# Patient Record
Sex: FEMALE | Race: WHITE | NOT HISPANIC OR LATINO
[De-identification: names, ages, dates, MRNs, and addresses within clinical notes are randomized per-mention and may not be internally consistent; named-entity substitution may affect disease eponyms.]

---

## 2017-09-15 PROBLEM — Z00.00 ENCOUNTER FOR PREVENTIVE HEALTH EXAMINATION: Status: ACTIVE | Noted: 2017-09-15

## 2017-10-10 ENCOUNTER — APPOINTMENT (OUTPATIENT)
Dept: RHEUMATOLOGY | Facility: CLINIC | Age: 72
End: 2017-10-10
Payer: MEDICARE

## 2017-10-10 VITALS
WEIGHT: 142 LBS | RESPIRATION RATE: 16 BRPM | OXYGEN SATURATION: 98 % | DIASTOLIC BLOOD PRESSURE: 96 MMHG | SYSTOLIC BLOOD PRESSURE: 162 MMHG | BODY MASS INDEX: 26.81 KG/M2 | HEIGHT: 61 IN | TEMPERATURE: 97.9 F | HEART RATE: 79 BPM

## 2017-10-10 DIAGNOSIS — Z82.49 FAMILY HISTORY OF ISCHEMIC HEART DISEASE AND OTHER DISEASES OF THE CIRCULATORY SYSTEM: ICD-10-CM

## 2017-10-10 DIAGNOSIS — Z86.79 PERSONAL HISTORY OF OTHER DISEASES OF THE CIRCULATORY SYSTEM: ICD-10-CM

## 2017-10-10 DIAGNOSIS — Z80.0 FAMILY HISTORY OF MALIGNANT NEOPLASM OF DIGESTIVE ORGANS: ICD-10-CM

## 2017-10-10 DIAGNOSIS — Z86.69 PERSONAL HISTORY OF OTHER DISEASES OF THE NERVOUS SYSTEM AND SENSE ORGANS: ICD-10-CM

## 2017-10-10 PROCEDURE — 99205 OFFICE O/P NEW HI 60 MIN: CPT

## 2017-10-10 RX ORDER — TIMOLOL MALEATE 5 MG/ML
SOLUTION/ DROPS OPHTHALMIC
Refills: 0 | Status: ACTIVE | COMMUNITY

## 2017-10-10 RX ORDER — AMLODIPINE BESYLATE 10 MG/1
10 TABLET ORAL DAILY
Refills: 0 | Status: ACTIVE | COMMUNITY

## 2017-10-12 LAB
ANA PAT FLD IF-IMP: ABNORMAL
ANA SER IF-ACNC: ABNORMAL
CCP AB SER IA-ACNC: 20 UNITS
CK BB SERPL ELPH-CCNC: 0 % (ref 0–?)
CK MB CFR SERPL ELPH: 0 %
CK MM SERPL ELPH-CCNC: 100 %
CREATINE KINASE,TOTAL,SERUM: 55 U/L
CRP SERPL-MCNC: 0.6 MG/DL
ERYTHROCYTE [SEDIMENTATION RATE] IN BLOOD BY WESTERGREN METHOD: 21 MM/HR
MACRO TYPE 1: 0 %
MACRO TYPE 2: 0 %
RF+CCP IGG SER-IMP: ABNORMAL

## 2017-10-25 ENCOUNTER — MOBILE ON CALL (OUTPATIENT)
Age: 72
End: 2017-10-25

## 2017-10-31 ENCOUNTER — APPOINTMENT (OUTPATIENT)
Dept: RHEUMATOLOGY | Facility: CLINIC | Age: 72
End: 2017-10-31
Payer: MEDICARE

## 2017-10-31 VITALS
SYSTOLIC BLOOD PRESSURE: 128 MMHG | HEIGHT: 61 IN | BODY MASS INDEX: 27.38 KG/M2 | WEIGHT: 145 LBS | OXYGEN SATURATION: 99 % | TEMPERATURE: 98 F | DIASTOLIC BLOOD PRESSURE: 82 MMHG | RESPIRATION RATE: 18 BRPM | HEART RATE: 86 BPM

## 2017-10-31 PROCEDURE — 99214 OFFICE O/P EST MOD 30 MIN: CPT

## 2017-10-31 RX ORDER — DICLOFENAC SODIUM 10 MG/G
1 GEL TOPICAL
Qty: 1 | Refills: 3 | Status: ACTIVE | COMMUNITY
Start: 2017-10-31 | End: 1900-01-01

## 2017-11-13 ENCOUNTER — CHART COPY (OUTPATIENT)
Age: 72
End: 2017-11-13

## 2018-03-06 ENCOUNTER — APPOINTMENT (OUTPATIENT)
Dept: RHEUMATOLOGY | Facility: CLINIC | Age: 73
End: 2018-03-06
Payer: MEDICARE

## 2018-03-06 VITALS
RESPIRATION RATE: 18 BRPM | OXYGEN SATURATION: 94 % | DIASTOLIC BLOOD PRESSURE: 84 MMHG | HEIGHT: 61 IN | TEMPERATURE: 98.4 F | HEART RATE: 84 BPM | BODY MASS INDEX: 27.38 KG/M2 | SYSTOLIC BLOOD PRESSURE: 136 MMHG | WEIGHT: 145 LBS

## 2018-03-06 LAB
CRP SERPL-MCNC: 1.2 MG/DL
ERYTHROCYTE [SEDIMENTATION RATE] IN BLOOD BY WESTERGREN METHOD: 24 MM/HR

## 2018-03-06 PROCEDURE — 99214 OFFICE O/P EST MOD 30 MIN: CPT

## 2018-03-07 LAB
ANA SER IF-ACNC: NEGATIVE
CCP AB SER IA-ACNC: <8 UNITS
RF+CCP IGG SER-IMP: NEGATIVE

## 2018-03-16 ENCOUNTER — MOBILE ON CALL (OUTPATIENT)
Age: 73
End: 2018-03-16

## 2018-04-20 ENCOUNTER — MOBILE ON CALL (OUTPATIENT)
Age: 73
End: 2018-04-20

## 2018-04-24 ENCOUNTER — LABORATORY RESULT (OUTPATIENT)
Age: 73
End: 2018-04-24

## 2018-04-24 ENCOUNTER — APPOINTMENT (OUTPATIENT)
Dept: RHEUMATOLOGY | Facility: CLINIC | Age: 73
End: 2018-04-24
Payer: MEDICARE

## 2018-04-24 VITALS
HEIGHT: 61 IN | SYSTOLIC BLOOD PRESSURE: 134 MMHG | OXYGEN SATURATION: 98 % | RESPIRATION RATE: 16 BRPM | HEART RATE: 83 BPM | WEIGHT: 144 LBS | TEMPERATURE: 98.3 F | DIASTOLIC BLOOD PRESSURE: 82 MMHG | BODY MASS INDEX: 27.19 KG/M2

## 2018-04-24 PROCEDURE — 99215 OFFICE O/P EST HI 40 MIN: CPT | Mod: 25

## 2018-04-24 PROCEDURE — 20610 DRAIN/INJ JOINT/BURSA W/O US: CPT | Mod: RT

## 2018-04-24 RX ADMIN — METHYLPREDNISOLONE ACETATE 0 MG/ML: 40 INJECTION, SUSPENSION INTRA-ARTICULAR; INTRALESIONAL; INTRAMUSCULAR; SOFT TISSUE at 00:00

## 2018-04-24 RX ADMIN — Medication 0 %: at 00:00

## 2018-04-25 ENCOUNTER — MED ADMIN CHARGE (OUTPATIENT)
Age: 73
End: 2018-04-25

## 2018-04-25 LAB
ALBUMIN SERPL ELPH-MCNC: 3.8 G/DL
ALP BLD-CCNC: 71 U/L
ALT SERPL-CCNC: 6 U/L
ANION GAP SERPL CALC-SCNC: 17 MMOL/L
APPEARANCE: CLEAR
AST SERPL-CCNC: 19 U/L
BASOPHILS # BLD AUTO: 0.04 K/UL
BASOPHILS NFR BLD AUTO: 0.4 %
BILIRUB SERPL-MCNC: 0.3 MG/DL
BILIRUBIN URINE: NEGATIVE
BLOOD URINE: NEGATIVE
BUN SERPL-MCNC: 22 MG/DL
CALCIUM SERPL-MCNC: 10.2 MG/DL
CCP AB SER IA-ACNC: 8 UNITS
CHLORIDE SERPL-SCNC: 100 MMOL/L
CO2 SERPL-SCNC: 22 MMOL/L
COLOR: YELLOW
CREAT SERPL-MCNC: 0.73 MG/DL
CRP SERPL-MCNC: 1.5 MG/DL
EOSINOPHIL # BLD AUTO: 0.04 K/UL
EOSINOPHIL NFR BLD AUTO: 0.4 %
ERYTHROCYTE [SEDIMENTATION RATE] IN BLOOD BY WESTERGREN METHOD: 40 MM/HR
GLUCOSE QUALITATIVE U: NEGATIVE MG/DL
GLUCOSE SERPL-MCNC: 68 MG/DL
HCT VFR BLD CALC: 39.2 %
HGB BLD-MCNC: 11.9 G/DL
IMM GRANULOCYTES NFR BLD AUTO: 0.1 %
KETONES URINE: NEGATIVE
LEUKOCYTE ESTERASE URINE: ABNORMAL
LYMPHOCYTES # BLD AUTO: 1.42 K/UL
LYMPHOCYTES NFR BLD AUTO: 15.9 %
MAN DIFF?: NORMAL
MCHC RBC-ENTMCNC: 27.8 PG
MCHC RBC-ENTMCNC: 30.4 GM/DL
MCV RBC AUTO: 91.6 FL
MONOCYTES # BLD AUTO: 0.59 K/UL
MONOCYTES NFR BLD AUTO: 6.6 %
NEUTROPHILS # BLD AUTO: 6.85 K/UL
NEUTROPHILS NFR BLD AUTO: 76.6 %
NITRITE URINE: NEGATIVE
PH URINE: 5.5
PLATELET # BLD AUTO: 468 K/UL
POTASSIUM SERPL-SCNC: 4 MMOL/L
PROT SERPL-MCNC: 8.3 G/DL
PROTEIN URINE: NEGATIVE MG/DL
RBC # BLD: 4.28 M/UL
RBC # FLD: 14.6 %
RF+CCP IGG SER-IMP: NEGATIVE
RHEUMATOID FACT SER QL: <7 IU/ML
SODIUM SERPL-SCNC: 139 MMOL/L
SPECIFIC GRAVITY URINE: 1.02
TSH SERPL-ACNC: 3.15 UIU/ML
URATE SERPL-MCNC: 3.3 MG/DL
UROBILINOGEN URINE: NEGATIVE MG/DL
WBC # FLD AUTO: 8.95 K/UL

## 2018-04-25 RX ORDER — LIDOCAINE HYDROCHLORIDE 10 MG/ML
1 INJECTION, SOLUTION INFILTRATION; PERINEURAL
Qty: 0 | Refills: 0 | Status: COMPLETED | OUTPATIENT
Start: 2018-04-24

## 2018-04-25 RX ORDER — METHYLPRED ACET/NACL,ISO-OS/PF 80 MG/ML
80 VIAL (ML) INJECTION
Qty: 1 | Refills: 0 | Status: COMPLETED | OUTPATIENT
Start: 2018-04-24

## 2018-04-26 LAB
ALBUMIN MFR SERPL ELPH: 45.6 %
ALBUMIN SERPL-MCNC: 3.8 G/DL
ALBUMIN/GLOB SERPL: 0.8 RATIO
ALBUPE: 27.3 %
ALDOLASE SERPL-CCNC: 5 U/L
ALPHA1 GLOB MFR SERPL ELPH: 6.7 %
ALPHA1 GLOB SERPL ELPH-MCNC: 0.6 G/DL
ALPHA1UPE: 12.2 %
ALPHA2 GLOB MFR SERPL ELPH: 13.3 %
ALPHA2 GLOB SERPL ELPH-MCNC: 1.1 G/DL
ALPHA2UPE: 17.2 %
ANA PAT FLD IF-IMP: ABNORMAL
ANA SER IF-ACNC: ABNORMAL
B-GLOBULIN MFR SERPL ELPH: 16.3 %
B-GLOBULIN SERPL ELPH-MCNC: 1.4 G/DL
BETAUPE: 14.3 %
CK BB SERPL ELPH-CCNC: 0 % (ref 0–?)
CK MB CFR SERPL ELPH: 0 %
CK MM SERPL ELPH-CCNC: 100 %
CREAT 24H UR-MCNC: NORMAL G/24 H
CREATINE KINASE,TOTAL,SERUM: 126 U/L
CREATININE UR (MAYO): 76 MG/DL
GAMMA GLOB FLD ELPH-MCNC: 1.5 G/DL
GAMMA GLOB MFR SERPL ELPH: 18.1 %
GAMMAUPE: 29 %
IGA 24H UR QL IFE: NORMAL
INTERPRETATION SERPL IEP-IMP: NORMAL
KAPPA LC 24H UR QL: NORMAL
MACRO TYPE 1: 0 %
MACRO TYPE 2: 0 %
PROT PATTERN 24H UR ELPH-IMP: NORMAL
PROT SERPL-MCNC: 8.3 G/DL
PROT SERPL-MCNC: 8.3 G/DL
PROT UR-MCNC: 9 MG/DL
PROT UR-MCNC: 9 MG/DL
SPECIMEN VOL 24H UR: NORMAL ML

## 2018-05-14 ENCOUNTER — MOBILE ON CALL (OUTPATIENT)
Age: 73
End: 2018-05-14

## 2018-05-24 ENCOUNTER — APPOINTMENT (OUTPATIENT)
Dept: RHEUMATOLOGY | Facility: CLINIC | Age: 73
End: 2018-05-24
Payer: MEDICARE

## 2018-05-24 VITALS
TEMPERATURE: 98.4 F | RESPIRATION RATE: 16 BRPM | HEART RATE: 84 BPM | BODY MASS INDEX: 27 KG/M2 | DIASTOLIC BLOOD PRESSURE: 72 MMHG | SYSTOLIC BLOOD PRESSURE: 118 MMHG | WEIGHT: 143 LBS | HEIGHT: 61 IN | OXYGEN SATURATION: 99 %

## 2018-05-24 PROCEDURE — 20610 DRAIN/INJ JOINT/BURSA W/O US: CPT | Mod: RT

## 2018-05-24 PROCEDURE — 99214 OFFICE O/P EST MOD 30 MIN: CPT | Mod: 25

## 2018-05-24 RX ORDER — METHYLPRED ACET/NACL,ISO-OS/PF 80 MG/ML
80 VIAL (ML) INJECTION
Qty: 1 | Refills: 0 | Status: COMPLETED | OUTPATIENT
Start: 2018-05-24

## 2018-05-24 RX ORDER — LIDOCAINE HYDROCHLORIDE 10 MG/ML
1 INJECTION, SOLUTION INFILTRATION; PERINEURAL
Qty: 0 | Refills: 0 | Status: COMPLETED | OUTPATIENT
Start: 2018-05-24

## 2018-05-24 RX ADMIN — Medication 0 %: at 00:00

## 2018-05-24 RX ADMIN — Medication 0 MG/ML: at 00:00

## 2018-06-15 ENCOUNTER — MOBILE ON CALL (OUTPATIENT)
Age: 73
End: 2018-06-15

## 2018-06-19 ENCOUNTER — APPOINTMENT (OUTPATIENT)
Dept: RHEUMATOLOGY | Facility: CLINIC | Age: 73
End: 2018-06-19
Payer: MEDICARE

## 2018-06-19 VITALS
HEIGHT: 61 IN | BODY MASS INDEX: 26.81 KG/M2 | WEIGHT: 142 LBS | OXYGEN SATURATION: 98 % | SYSTOLIC BLOOD PRESSURE: 128 MMHG | DIASTOLIC BLOOD PRESSURE: 74 MMHG | RESPIRATION RATE: 16 BRPM | TEMPERATURE: 98.3 F | HEART RATE: 75 BPM

## 2018-06-19 PROCEDURE — 99214 OFFICE O/P EST MOD 30 MIN: CPT

## 2018-07-11 ENCOUNTER — MOBILE ON CALL (OUTPATIENT)
Age: 73
End: 2018-07-11

## 2018-07-17 ENCOUNTER — APPOINTMENT (OUTPATIENT)
Dept: RHEUMATOLOGY | Facility: CLINIC | Age: 73
End: 2018-07-17

## 2018-07-18 ENCOUNTER — MOBILE ON CALL (OUTPATIENT)
Age: 73
End: 2018-07-18

## 2018-07-19 ENCOUNTER — APPOINTMENT (OUTPATIENT)
Dept: RHEUMATOLOGY | Facility: CLINIC | Age: 73
End: 2018-07-19
Payer: MEDICARE

## 2018-07-19 VITALS
HEART RATE: 64 BPM | BODY MASS INDEX: 26.62 KG/M2 | OXYGEN SATURATION: 98 % | WEIGHT: 141 LBS | RESPIRATION RATE: 16 BRPM | TEMPERATURE: 98.1 F | DIASTOLIC BLOOD PRESSURE: 85 MMHG | SYSTOLIC BLOOD PRESSURE: 149 MMHG | HEIGHT: 61 IN

## 2018-07-19 PROCEDURE — 20610 DRAIN/INJ JOINT/BURSA W/O US: CPT | Mod: RT

## 2018-07-26 ENCOUNTER — APPOINTMENT (OUTPATIENT)
Dept: RHEUMATOLOGY | Facility: CLINIC | Age: 73
End: 2018-07-26
Payer: MEDICARE

## 2018-07-26 VITALS
BODY MASS INDEX: 26.43 KG/M2 | WEIGHT: 140 LBS | RESPIRATION RATE: 16 BRPM | HEIGHT: 61 IN | OXYGEN SATURATION: 98 % | HEART RATE: 69 BPM | SYSTOLIC BLOOD PRESSURE: 104 MMHG | DIASTOLIC BLOOD PRESSURE: 80 MMHG | TEMPERATURE: 98 F

## 2018-07-26 PROCEDURE — 20610 DRAIN/INJ JOINT/BURSA W/O US: CPT | Mod: RT

## 2018-07-26 RX ADMIN — Medication 0 MG/2ML: at 00:00

## 2018-08-02 ENCOUNTER — APPOINTMENT (OUTPATIENT)
Dept: RHEUMATOLOGY | Facility: CLINIC | Age: 73
End: 2018-08-02
Payer: MEDICARE

## 2018-08-02 VITALS
SYSTOLIC BLOOD PRESSURE: 137 MMHG | WEIGHT: 140 LBS | OXYGEN SATURATION: 97 % | HEART RATE: 72 BPM | TEMPERATURE: 97.8 F | DIASTOLIC BLOOD PRESSURE: 84 MMHG | BODY MASS INDEX: 26.43 KG/M2 | HEIGHT: 61 IN | RESPIRATION RATE: 16 BRPM

## 2018-08-02 PROCEDURE — 20610 DRAIN/INJ JOINT/BURSA W/O US: CPT | Mod: RT

## 2018-08-02 RX ORDER — HYALURONATE SODIUM 30 MG/2 ML
30 SYRINGE (ML) INTRAARTICULAR
Refills: 0 | Status: COMPLETED | OUTPATIENT
Start: 2018-08-02 | End: 2018-08-02

## 2018-08-02 RX ADMIN — Medication 0 MG/2ML: at 00:00

## 2018-08-14 ENCOUNTER — APPOINTMENT (OUTPATIENT)
Dept: RHEUMATOLOGY | Facility: CLINIC | Age: 73
End: 2018-08-14
Payer: MEDICARE

## 2018-08-14 VITALS
RESPIRATION RATE: 16 BRPM | OXYGEN SATURATION: 97 % | HEIGHT: 61 IN | WEIGHT: 143 LBS | DIASTOLIC BLOOD PRESSURE: 85 MMHG | HEART RATE: 69 BPM | TEMPERATURE: 98.3 F | BODY MASS INDEX: 27 KG/M2 | SYSTOLIC BLOOD PRESSURE: 152 MMHG

## 2018-08-14 PROCEDURE — 20610 DRAIN/INJ JOINT/BURSA W/O US: CPT | Mod: RT

## 2018-08-14 RX ORDER — HYALURONATE SODIUM 30 MG/2 ML
30 SYRINGE (ML) INTRAARTICULAR
Qty: 0 | Refills: 0 | Status: COMPLETED | OUTPATIENT
Start: 2018-07-26

## 2018-11-20 ENCOUNTER — APPOINTMENT (OUTPATIENT)
Dept: RHEUMATOLOGY | Facility: CLINIC | Age: 73
End: 2018-11-20
Payer: MEDICARE

## 2018-11-20 VITALS
TEMPERATURE: 98.4 F | HEART RATE: 74 BPM | WEIGHT: 140 LBS | BODY MASS INDEX: 26.43 KG/M2 | HEIGHT: 61 IN | SYSTOLIC BLOOD PRESSURE: 146 MMHG | DIASTOLIC BLOOD PRESSURE: 87 MMHG | RESPIRATION RATE: 16 BRPM | OXYGEN SATURATION: 98 %

## 2018-11-20 LAB
BASOPHILS # BLD AUTO: 0.05 K/UL
BASOPHILS NFR BLD AUTO: 0.5 %
EOSINOPHIL # BLD AUTO: 0.11 K/UL
EOSINOPHIL NFR BLD AUTO: 1.2 %
HCT VFR BLD CALC: 40.9 %
HGB BLD-MCNC: 13.3 G/DL
IMM GRANULOCYTES NFR BLD AUTO: 0.3 %
LYMPHOCYTES # BLD AUTO: 1.67 K/UL
LYMPHOCYTES NFR BLD AUTO: 18.3 %
MAN DIFF?: NORMAL
MCHC RBC-ENTMCNC: 29.4 PG
MCHC RBC-ENTMCNC: 32.5 GM/DL
MCV RBC AUTO: 90.3 FL
MONOCYTES # BLD AUTO: 0.8 K/UL
MONOCYTES NFR BLD AUTO: 8.8 %
NEUTROPHILS # BLD AUTO: 6.48 K/UL
NEUTROPHILS NFR BLD AUTO: 70.9 %
PLATELET # BLD AUTO: 487 K/UL
RBC # BLD: 4.53 M/UL
RBC # FLD: 14.6 %
WBC # FLD AUTO: 9.14 K/UL

## 2018-11-20 PROCEDURE — 99215 OFFICE O/P EST HI 40 MIN: CPT | Mod: 25

## 2018-11-20 PROCEDURE — 20610 DRAIN/INJ JOINT/BURSA W/O US: CPT | Mod: LT

## 2018-11-20 RX ORDER — METHYLPRED ACET/NACL,ISO-OS/PF 40 MG/ML
40 VIAL (ML) INJECTION
Qty: 1 | Refills: 0 | Status: COMPLETED | OUTPATIENT
Start: 2018-11-20

## 2018-11-20 RX ORDER — LIDOCAINE HYDROCHLORIDE 10 MG/ML
1 INJECTION, SOLUTION INFILTRATION; PERINEURAL
Qty: 0 | Refills: 0 | Status: COMPLETED | OUTPATIENT
Start: 2018-11-20

## 2018-11-20 RX ADMIN — METHYLPREDNISOLONE ACETATE 0 MG/ML: 40 INJECTION, SUSPENSION INTRA-ARTICULAR; INTRALESIONAL; INTRAMUSCULAR; SOFT TISSUE at 00:00

## 2018-11-20 RX ADMIN — LIDOCAINE HYDROCHLORIDE 0 %: 10 INJECTION, SOLUTION EPIDURAL; INFILTRATION; INTRACAUDAL; PERINEURAL at 00:00

## 2018-11-21 LAB
25(OH)D3 SERPL-MCNC: 37.6 NG/ML
CCP AB SER IA-ACNC: <8 UNITS
CRP SERPL-MCNC: 1.39 MG/DL
ERYTHROCYTE [SEDIMENTATION RATE] IN BLOOD BY WESTERGREN METHOD: 51 MM/HR
RF+CCP IGG SER-IMP: NEGATIVE
RHEUMATOID FACT SER QL: 10 IU/ML

## 2018-11-23 ENCOUNTER — MOBILE ON CALL (OUTPATIENT)
Age: 73
End: 2018-11-23

## 2018-12-20 ENCOUNTER — APPOINTMENT (OUTPATIENT)
Dept: RHEUMATOLOGY | Facility: CLINIC | Age: 73
End: 2018-12-20
Payer: MEDICARE

## 2018-12-20 VITALS
HEART RATE: 74 BPM | HEIGHT: 61 IN | TEMPERATURE: 98.1 F | DIASTOLIC BLOOD PRESSURE: 88 MMHG | OXYGEN SATURATION: 98 % | SYSTOLIC BLOOD PRESSURE: 165 MMHG | RESPIRATION RATE: 16 BRPM | BODY MASS INDEX: 26.62 KG/M2 | WEIGHT: 141 LBS

## 2018-12-20 PROCEDURE — 99214 OFFICE O/P EST MOD 30 MIN: CPT | Mod: 25

## 2018-12-20 PROCEDURE — 20610 DRAIN/INJ JOINT/BURSA W/O US: CPT | Mod: LT

## 2018-12-20 RX ORDER — HYALURONATE SODIUM 30 MG/2 ML
30 SYRINGE (ML) INTRAARTICULAR
Qty: 0 | Refills: 0 | Status: COMPLETED | OUTPATIENT
Start: 2018-12-20

## 2018-12-20 RX ADMIN — Medication 0 MG/2ML: at 00:00

## 2019-01-02 ENCOUNTER — APPOINTMENT (OUTPATIENT)
Dept: RHEUMATOLOGY | Facility: CLINIC | Age: 74
End: 2019-01-02
Payer: MEDICARE

## 2019-01-02 VITALS
HEART RATE: 74 BPM | SYSTOLIC BLOOD PRESSURE: 159 MMHG | OXYGEN SATURATION: 99 % | WEIGHT: 141 LBS | HEIGHT: 61 IN | BODY MASS INDEX: 26.62 KG/M2 | RESPIRATION RATE: 16 BRPM | TEMPERATURE: 97.7 F | DIASTOLIC BLOOD PRESSURE: 90 MMHG

## 2019-01-02 VITALS — HEART RATE: 75 BPM | DIASTOLIC BLOOD PRESSURE: 90 MMHG | SYSTOLIC BLOOD PRESSURE: 151 MMHG

## 2019-01-02 PROCEDURE — 20610 DRAIN/INJ JOINT/BURSA W/O US: CPT | Mod: LT

## 2019-01-02 RX ORDER — HYALURONATE SODIUM 30 MG/2 ML
30 SYRINGE (ML) INTRAARTICULAR
Qty: 0 | Refills: 0 | Status: COMPLETED | OUTPATIENT
Start: 2019-01-02

## 2019-01-02 RX ADMIN — Medication 0 MG/2ML: at 00:00

## 2019-01-09 ENCOUNTER — APPOINTMENT (OUTPATIENT)
Dept: RHEUMATOLOGY | Facility: CLINIC | Age: 74
End: 2019-01-09
Payer: MEDICARE

## 2019-01-09 VITALS
HEIGHT: 61 IN | TEMPERATURE: 98.1 F | RESPIRATION RATE: 16 BRPM | OXYGEN SATURATION: 97 % | SYSTOLIC BLOOD PRESSURE: 154 MMHG | DIASTOLIC BLOOD PRESSURE: 96 MMHG | HEART RATE: 76 BPM | BODY MASS INDEX: 25.86 KG/M2 | WEIGHT: 137 LBS

## 2019-01-09 PROCEDURE — 20610 DRAIN/INJ JOINT/BURSA W/O US: CPT | Mod: LT

## 2019-01-09 RX ADMIN — Medication 0 MG/2ML: at 00:00

## 2019-01-09 NOTE — HISTORY OF PRESENT ILLNESS
[FreeTextEntry1] : Patient reports resurfacing of pain in the left knee over the last 3-4 weeks. She is feeling a 8/10 pain over the left knee with instability symptoms. She reports its producing low mood as it's interrupting with sleep.  She finds physical therapy continues to be helpful as it was for the right knee.  She continues to find relief of right knee pain by 75% from viscosupplementation and cortisone injection during the summer; She also reports intermittent throbbing pain along the clavicles and finds Alleve to provide mild relief, however she is apprehensive about continued use. She also reports stiffness over the shoulders however she is performing range of motion stretching exercises on a daily basis.  She otherwise denies motor disturbances or systemic symptoms.\par \par Patient continues with elevated inflammatory markers, albeit slightly decreased,  on last blood draw in November.  She denies scalp tenderness, h/a or jaw claudication.

## 2019-01-09 NOTE — ASSESSMENT
[FreeTextEntry1] : Patient presents with worsening L knee DJD and sternoclavicular cyst; elevated inflammatory markers:\par \par Patient will benefit from continued physical therapy to help with joint mobility and muscle strengthening.  In the setting of poor response from PT, Intra-articular steroids, and low dose NSAIDS, Orthovisc injection given today: 3of 3; Encouraged to hydrate and minimal weight bearing for the next 24 hours along with ice application 20 mins on/ 20 mins off.   Quadriceps strengthening exercises as well as rotator cuff strengthening exercises reiterated.  Patient up to date with health maintenance. \par \par She is in agreement with the above plan and will return in two months' time.\par \par \par

## 2019-01-09 NOTE — DATA REVIEWED
[FreeTextEntry1] : MRI of cervical spine from November 2017\par Straightening of normal cervical lordosis\par C3-C4 posterior disc bulge\par C4-C5, C5-C6 and C6-7 broad-based posterior disc osteophyte complex\par Anterior listhesis of C7 on T1 with central disc protrusion\par Multilevel foraminal stenosis seen\par Blood testing reports performed December 2017\par TSH 2.24\par Urinalysis without protein or blood

## 2019-01-09 NOTE — CONSULT LETTER
[Dear  ___] : Dear  [unfilled], [Courtesy Letter:] : I had the pleasure of seeing your patient, [unfilled], in my office today. [Please see my note below.] : Please see my note below. [Consult Closing:] : Thank you very much for allowing me to participate in the care of this patient.  If you have any questions, please do not hesitate to contact me. [Sincerely,] : Sincerely, [Mary Ann Chang MD] : Mary Ann Chang MD [ of Medicine] :  of Medicine [[facility] [employer] [facility]] : [facility] [FreeTextEntry2] : [place] [address]  [FreeTextEntry3] : Mary Ann Chang M.D.\par  of Medicine \par [facility] [employer] [facility] at [facility]\par \par

## 2019-01-09 NOTE — REVIEW OF SYSTEMS
[Arthralgias] : arthralgias [Joint Stiffness] : joint stiffness [Fever] : no fever [Chills] : no chills [Feeling Tired] : not feeling tired [Recent Weight Loss (___ Lbs)] : no recent weight loss [Eye Pain] : no eye pain [Dry Eyes] : no dryness of the eyes [Sore Throat] : no sore throat [Hoarseness] : no hoarseness [Chest Pain] : no chest pain [Lower Ext Edema] : no lower extremity edema [Shortness Of Breath] : no shortness of breath [Cough] : no cough [Abdominal Pain] : no abdominal pain [Heartburn] : no heartburn [Joint Pain] : no joint pain [Skin Lesions] : no skin lesions [Difficulty Walking] : no difficulty walking [Anxiety] : no anxiety [Depression] : no depression [Muscle Weakness] : no muscle weakness [Easy Bleeding] : no tendency for easy bleeding [Easy Bruising] : no tendency for easy bruising

## 2019-01-09 NOTE — PROCEDURE
[Other Date:___] : Date: [unfilled] [Patient] : the patient [Risks] : risks [Benefits] : benefits [Consent Obtained] : written consent was obtained prior to the procedure and is detailed in the patient's record [Therapeutic] : therapeutic [#1 Site: ______] : #1 site identified in the [unfilled] [Ethyl Chloride] : ethyl chloride [Betadine] : betadine solution [25 gauge 1.5 inch] : A 25 gauge 1.5 inch needle was used [___ml Visccosupplementation] : [unfilled] ml of viscosupplementation [Tolerated Well] : the patient tolerated the procedure well [No Complications] : there were no complications [de-identified] : OrthoVisc

## 2019-01-09 NOTE — PHYSICAL EXAM
[General Appearance - Alert] : alert [General Appearance - In No Acute Distress] : in no acute distress [Sclera] : the sclera and conjunctiva were normal [Examination Of The Oral Cavity] : the lips and gums were normal [Oropharynx] : the oropharynx was normal [Neck Appearance] : the appearance of the neck was normal [Respiration, Rhythm And Depth] : normal respiratory rhythm and effort [Heart Rate And Rhythm] : heart rate was normal and rhythm regular [Edema] : there was no peripheral edema [No Spinal Tenderness] : no spinal tenderness [Nail Clubbing] : no clubbing  or cyanosis of the fingernails [Musculoskeletal - Swelling] : no joint swelling seen [Motor Tone] : muscle strength and tone were normal [] : no rash [Motor Exam] : the motor exam was normal [Oriented To Time, Place, And Person] : oriented to person, place, and time [Impaired Insight] : insight and judgment were intact [FreeTextEntry1] : Flexor girdles intact

## 2019-01-10 ENCOUNTER — MOBILE ON CALL (OUTPATIENT)
Age: 74
End: 2019-01-10

## 2019-02-14 ENCOUNTER — APPOINTMENT (OUTPATIENT)
Dept: RHEUMATOLOGY | Facility: CLINIC | Age: 74
End: 2019-02-14

## 2019-02-26 ENCOUNTER — APPOINTMENT (OUTPATIENT)
Dept: RHEUMATOLOGY | Facility: CLINIC | Age: 74
End: 2019-02-26
Payer: MEDICARE

## 2019-02-26 VITALS
SYSTOLIC BLOOD PRESSURE: 152 MMHG | HEIGHT: 61 IN | TEMPERATURE: 97.9 F | DIASTOLIC BLOOD PRESSURE: 86 MMHG | HEART RATE: 73 BPM | WEIGHT: 140 LBS | BODY MASS INDEX: 26.43 KG/M2 | RESPIRATION RATE: 16 BRPM | OXYGEN SATURATION: 99 %

## 2019-02-26 DIAGNOSIS — R70.0 ELEVATED ERYTHROCYTE SEDIMENTATION RATE: ICD-10-CM

## 2019-02-26 DIAGNOSIS — M25.50 PAIN IN UNSPECIFIED JOINT: ICD-10-CM

## 2019-02-26 PROCEDURE — 99213 OFFICE O/P EST LOW 20 MIN: CPT

## 2019-03-21 NOTE — CONSULT LETTER
[Dear  ___] : Dear  [unfilled], [Courtesy Letter:] : I had the pleasure of seeing your patient, [unfilled], in my office today. [Please see my note below.] : Please see my note below. [Consult Closing:] : Thank you very much for allowing me to participate in the care of this patient.  If you have any questions, please do not hesitate to contact me. [Sincerely,] : Sincerely, [FreeTextEntry2] : Viky Presley M.D.\par [address]\[place] [address] \par  [FreeTextEntry3] : Mary Ann Chang M.D.\par  of Medicine \par [facility] at [facility]\par \par

## 2019-03-21 NOTE — REVIEW OF SYSTEMS
[Arthralgias] : arthralgias [Joint Pain] : joint pain [Joint Stiffness] : joint stiffness [Difficulty Walking] : difficulty walking [Fever] : no fever [Chills] : no chills [Feeling Poorly] : not feeling poorly [Feeling Tired] : not feeling tired [Eye Pain] : no eye pain [Dry Eyes] : no dryness of the eyes [Sore Throat] : no sore throat [Hoarseness] : no hoarseness [Chest Pain] : no chest pain [Palpitations] : no palpitations [Leg Claudication] : no intermittent leg claudication [Lower Ext Edema] : no lower extremity edema [Cough] : no cough [SOB on Exertion] : no shortness of breath during exertion [Joint Swelling] : no joint swelling [Skin Lesions] : no skin lesions [Limb Weakness] : no limb weakness [Anxiety] : no anxiety [Depression] : no depression [Muscle Weakness] : no muscle weakness [Feelings Of Weakness] : no feelings of weakness [Easy Bleeding] : no tendency for easy bleeding [Easy Bruising] : no tendency for easy bruising

## 2019-03-21 NOTE — HISTORY OF PRESENT ILLNESS
[FreeTextEntry1] : Patient reports moderate relief in R knee since viscosupplementation given in Aug 2018.  She finds the left knee stiffness continues to persist, albeit to a lesser degree,and finds OrthoVisc given in January provided 60% relief.   Patient had Hematology w/u r/o dyscrasia or myeloma in the setting of CRP elevation; rheum serologies returned negative including MICHELLE and RF.  This is in contrast to one year ago when MICHELLE/ACPA Abs revealed positivity, six months after Fluoroquinolone use, the initial trigger lending to diffuse arthralgias.    She otherwise visual disturbances, oral ulcers, shortness of breath, chest pain, motor/sensory disturbances, rash,  Raynauds, joint swelling or fever.\par

## 2019-03-21 NOTE — ASSESSMENT
[FreeTextEntry1] : Patient with bilateral knee OA with continued elevation of CRP:\par \par Thus far patient has had evaluations with the specialties to evaluate elevated inflammatory markers. At this point I would recommend patient to see cardiology to assess cardiovascular status in the setting of elevated CRP. We'll continue to monitor for evolving inflammatory disease however at this time the patient is improved compared to one year ago after antibiotic use when diffuse arthralgias were noted along with tendon ruptures.\par Patient will benefit from continued physical therapy to help with joint mobility and muscle strengthening.  Quadriceps strengthening exercises reiterated.  Weight loss has been encouraged to reduce load over the medial joint line.  Viscosupplementation has been encouraged to provide additional lubrication and joint support in May for L knee.  She is in agreement with the above plan and will return in one months' time.\par

## 2019-03-21 NOTE — DATA REVIEWED
[FreeTextEntry1] : 2/2018:\par \par MICHELLE neg\par RF neg\par SPEP: no monoclonal spike appreciated\par No light chains seen on immunofixation

## 2019-03-21 NOTE — PHYSICAL EXAM
[General Appearance - Alert] : alert [General Appearance - In No Acute Distress] : in no acute distress [Sclera] : the sclera and conjunctiva were normal [Examination Of The Oral Cavity] : the lips and gums were normal [Oropharynx] : the oropharynx was normal [Neck Appearance] : the appearance of the neck was normal [Auscultation Breath Sounds / Voice Sounds] : lungs were clear to auscultation bilaterally [Heart Rate And Rhythm] : heart rate was normal and rhythm regular [No Spinal Tenderness] : no spinal tenderness [Nail Clubbing] : no clubbing  or cyanosis of the fingernails [Motor Tone] : muscle strength and tone were normal [] : no rash [Skin Lesions] : no skin lesions [Sensation] : the sensory exam was normal to light touch and pinprick [Motor Exam] : the motor exam was normal [Oriented To Time, Place, And Person] : oriented to person, place, and time [Impaired Insight] : insight and judgment were intact [FreeTextEntry1] : Shoulder abduction to 160' b/l ; no active hand synovitis; knees with tenderness along the medial joint lines bilaterally, L >R

## 2019-05-14 ENCOUNTER — APPOINTMENT (OUTPATIENT)
Dept: RHEUMATOLOGY | Facility: CLINIC | Age: 74
End: 2019-05-14
Payer: MEDICARE

## 2019-05-14 VITALS
SYSTOLIC BLOOD PRESSURE: 171 MMHG | WEIGHT: 144 LBS | BODY MASS INDEX: 27.19 KG/M2 | DIASTOLIC BLOOD PRESSURE: 92 MMHG | TEMPERATURE: 98.2 F | OXYGEN SATURATION: 93 % | HEART RATE: 69 BPM | HEIGHT: 61 IN | RESPIRATION RATE: 16 BRPM

## 2019-05-14 DIAGNOSIS — M75.100 UNSPECIFIED ROTATOR CUFF TEAR OR RUPTURE OF UNSPECIFIED SHOULDER, NOT SPECIFIED AS TRAUMATIC: ICD-10-CM

## 2019-05-14 DIAGNOSIS — M77.9 ENTHESOPATHY, UNSPECIFIED: ICD-10-CM

## 2019-05-14 DIAGNOSIS — M25.511 PAIN IN RIGHT SHOULDER: ICD-10-CM

## 2019-05-14 PROCEDURE — 99213 OFFICE O/P EST LOW 20 MIN: CPT

## 2019-05-15 PROBLEM — M25.511 PAIN OF RIGHT STERNOCLAVICULAR JOINT: Status: ACTIVE | Noted: 2017-10-10

## 2019-05-15 PROBLEM — M75.100 ROTATOR CUFF TEAR: Status: ACTIVE | Noted: 2017-10-10

## 2019-05-15 PROBLEM — M77.9 ANKLE TENDINITIS: Status: ACTIVE | Noted: 2019-05-15

## 2019-05-15 NOTE — CONSULT LETTER
[Dear  ___] : Dear  [unfilled], [Please see my note below.] : Please see my note below. [Courtesy Letter:] : I had the pleasure of seeing your patient, [unfilled], in my office today. [Consult Closing:] : Thank you very much for allowing me to participate in the care of this patient.  If you have any questions, please do not hesitate to contact me. [Sincerely,] : Sincerely, [FreeTextEntry2] : Viky Presley M.D.\par [address]\[place] [address] \par  [FreeTextEntry3] : Mary Ann Chang M.D.\par  of Medicine \par [facility] [employer] [facility] at [facility]\par \par

## 2019-05-15 NOTE — ASSESSMENT
[FreeTextEntry1] : Patient with bilateral knee OA  with resurfacing pain; ankle tendonitis:\par \par Patient will benefit from continued physical therapy to help with joint mobility and muscle strengthening.  Quadriceps strengthening exercises reiterated.  Weight loss has been encouraged to reduce load over the medial joint line.  Viscosupplementation has been encouraged to provide additional lubrication and joint support; currently insurance auths are pending.  \par Encouraged sneakers for ankle tendinitis; reflected on US imaging today with mild fluid surrounding the PT tendon. Discussed the importance of BP control; patient reports medication compliance. Would recommend combination therapy.\par  She is in agreement with the above plan and will return in one months' time.\par

## 2019-05-15 NOTE — PHYSICAL EXAM
[General Appearance - In No Acute Distress] : in no acute distress [General Appearance - Alert] : alert [Sclera] : the sclera and conjunctiva were normal [Examination Of The Oral Cavity] : the lips and gums were normal [Oropharynx] : the oropharynx was normal [Neck Appearance] : the appearance of the neck was normal [Auscultation Breath Sounds / Voice Sounds] : lungs were clear to auscultation bilaterally [No Spinal Tenderness] : no spinal tenderness [Heart Rate And Rhythm] : heart rate was normal and rhythm regular [Nail Clubbing] : no clubbing  or cyanosis of the fingernails [Motor Tone] : muscle strength and tone were normal [] : no rash [Skin Lesions] : no skin lesions [Sensation] : the sensory exam was normal to light touch and pinprick [Motor Exam] : the motor exam was normal [Impaired Insight] : insight and judgment were intact [Oriented To Time, Place, And Person] : oriented to person, place, and time [FreeTextEntry1] : Shoulder abduction to 160' b/l ; no active hand synovitis; knees with tenderness along the medial joint lines bilaterally, L >R; mild tednerness over the posterior aspect of media malleolus without erythema or swelling.

## 2019-05-15 NOTE — REVIEW OF SYSTEMS
[Arthralgias] : arthralgias [Joint Pain] : joint pain [Joint Stiffness] : joint stiffness [Difficulty Walking] : difficulty walking [Fever] : no fever [Feeling Poorly] : not feeling poorly [Chills] : no chills [Dry Eyes] : no dryness of the eyes [Eye Pain] : no eye pain [Feeling Tired] : not feeling tired [Hoarseness] : no hoarseness [Sore Throat] : no sore throat [Chest Pain] : no chest pain [Palpitations] : no palpitations [Leg Claudication] : no intermittent leg claudication [SOB on Exertion] : no shortness of breath during exertion [Cough] : no cough [Lower Ext Edema] : no lower extremity edema [Limb Weakness] : no limb weakness [Skin Lesions] : no skin lesions [Joint Swelling] : no joint swelling [Anxiety] : no anxiety [Depression] : no depression [Muscle Weakness] : no muscle weakness [Feelings Of Weakness] : no feelings of weakness [Easy Bruising] : no tendency for easy bruising [Easy Bleeding] : no tendency for easy bleeding

## 2019-05-15 NOTE — HISTORY OF PRESENT ILLNESS
[FreeTextEntry1] : Patient finds resurfacing of stiffness in bilateral knees. She reports viscosupplementation having helped in the past.  She explains mild discomfort over the right ankle without associated swelling. She does not recall inciting factors. She reports weightbearing can aggravate the pain.\par Patient had Hematology w/u r/o dyscrasia or myeloma in the setting of CRP elevation; rheum serologies returned negative including MICHELLE and RF.  This is in contrast to one year ago when MICHELLE/ACPA Abs revealed positivity, six months after Fluoroquinolone use, the initial trigger lending to diffuse arthralgias.    She otherwise visual disturbances, oral ulcers, shortness of breath, chest pain, motor/sensory disturbances, rash,  Raynauds, joint swelling or fever.\par

## 2019-05-21 ENCOUNTER — APPOINTMENT (OUTPATIENT)
Dept: RHEUMATOLOGY | Facility: CLINIC | Age: 74
End: 2019-05-21

## 2019-06-10 ENCOUNTER — MOBILE ON CALL (OUTPATIENT)
Age: 74
End: 2019-06-10

## 2019-06-25 ENCOUNTER — APPOINTMENT (OUTPATIENT)
Dept: RHEUMATOLOGY | Facility: CLINIC | Age: 74
End: 2019-06-25
Payer: MEDICARE

## 2019-06-25 VITALS
BODY MASS INDEX: 26.81 KG/M2 | WEIGHT: 142 LBS | HEART RATE: 75 BPM | RESPIRATION RATE: 16 BRPM | SYSTOLIC BLOOD PRESSURE: 162 MMHG | TEMPERATURE: 98.1 F | HEIGHT: 61 IN | OXYGEN SATURATION: 98 % | DIASTOLIC BLOOD PRESSURE: 90 MMHG

## 2019-06-25 DIAGNOSIS — M17.12 UNILATERAL PRIMARY OSTEOARTHRITIS, LEFT KNEE: ICD-10-CM

## 2019-06-25 PROCEDURE — 20610 DRAIN/INJ JOINT/BURSA W/O US: CPT | Mod: LT

## 2019-06-25 RX ORDER — HYALURONATE SODIUM 30 MG/2 ML
30 SYRINGE (ML) INTRAARTICULAR
Qty: 0 | Refills: 0 | Status: COMPLETED | OUTPATIENT
Start: 2019-06-25

## 2019-06-25 RX ADMIN — Medication 0 MG/2ML: at 00:00

## 2019-06-25 NOTE — ASSESSMENT
[FreeTextEntry1] : Patient receives #1 of 3 OrthoVisc L knee:\par Patient will benefit from continued physical therapy to help with joint mobility and muscle strengthening.  Encouraged to hydrate and minimal weight bearing for the next 24 hours along with ice application 20 mins on/ 20 mins off.  She is in agreement with the above plan and will return in one weeks' time.\par

## 2019-06-25 NOTE — PROCEDURE
[Patient] : the patient [Today's Date:] : Date: [unfilled] [Risks] : risks [Benefits] : benefits [Consent Obtained] : written consent was obtained prior to the procedure and is detailed in the patient's record [Therapeutic] : therapeutic [#1 Site: ______] : #1 site identified in the [unfilled] [Ethyl Chloride] : ethyl chloride [Betadine] : betadine solution [25 gauge 1.5 inch] : A 25 gauge 1.5 inch needle was used [___ml Orthovisc] : [unfilled] ml of orthovisc [No Complications] : there were no complications [Tolerated Well] : the patient tolerated the procedure well

## 2019-07-02 ENCOUNTER — APPOINTMENT (OUTPATIENT)
Dept: RHEUMATOLOGY | Facility: CLINIC | Age: 74
End: 2019-07-02
Payer: MEDICARE

## 2019-07-02 VITALS
SYSTOLIC BLOOD PRESSURE: 155 MMHG | HEIGHT: 61 IN | TEMPERATURE: 97.9 F | DIASTOLIC BLOOD PRESSURE: 85 MMHG | OXYGEN SATURATION: 96 % | WEIGHT: 146 LBS | HEART RATE: 70 BPM | RESPIRATION RATE: 16 BRPM | BODY MASS INDEX: 27.56 KG/M2

## 2019-07-02 DIAGNOSIS — I10 ESSENTIAL (PRIMARY) HYPERTENSION: ICD-10-CM

## 2019-07-02 PROCEDURE — 20610 DRAIN/INJ JOINT/BURSA W/O US: CPT | Mod: LT

## 2019-07-02 RX ORDER — HYALURONATE SODIUM 30 MG/2 ML
30 SYRINGE (ML) INTRAARTICULAR
Qty: 0 | Refills: 0 | Status: COMPLETED | OUTPATIENT
Start: 2019-07-02

## 2019-07-02 RX ORDER — HYDROCHLOROTHIAZIDE 12.5 MG/1
12.5 CAPSULE ORAL DAILY
Qty: 30 | Refills: 0 | Status: ACTIVE | COMMUNITY
Start: 2019-07-02 | End: 1900-01-01

## 2019-07-02 RX ADMIN — Medication 0 MG/2ML: at 00:00

## 2019-07-02 NOTE — PROCEDURE
[Today's Date:] : Date: [unfilled] [Risks] : risks [Patient] : the patient [Benefits] : benefits [Therapeutic] : therapeutic [Consent Obtained] : written consent was obtained prior to the procedure and is detailed in the patient's record [#1 Site: ______] : #1 site identified in the [unfilled] [Ethyl Chloride] : ethyl chloride [Betadine] : betadine solution [25 gauge 1.5 inch] : A 25 gauge 1.5 inch needle was used [___ml Orthovisc] : [unfilled] ml of orthovisc [Tolerated Well] : the patient tolerated the procedure well [No Complications] : there were no complications

## 2019-07-02 NOTE — ASSESSMENT
[FreeTextEntry1] : Patient receives #2of 3 OrthoVisc L knee:\par Patient will benefit from continued physical therapy to help with joint mobility and muscle strengthening.  Encouraged to hydrate and minimal weight bearing for the next 24 hours along with ice application 20 mins on/ 20 mins off.  She is in agreement with the above plan and will return in one weeks' time.\par

## 2019-07-09 ENCOUNTER — APPOINTMENT (OUTPATIENT)
Dept: RHEUMATOLOGY | Facility: CLINIC | Age: 74
End: 2019-07-09
Payer: MEDICARE

## 2019-07-09 PROCEDURE — 20610 DRAIN/INJ JOINT/BURSA W/O US: CPT | Mod: 50

## 2019-07-09 RX ORDER — HYALURONATE SODIUM 30 MG/2 ML
30 SYRINGE (ML) INTRAARTICULAR
Qty: 0 | Refills: 0 | Status: COMPLETED | OUTPATIENT
Start: 2019-07-09

## 2019-07-09 RX ADMIN — Medication 0 MG/2ML: at 00:00

## 2019-07-09 NOTE — PROCEDURE
[Today's Date:] : Date: [unfilled] [Patient] : the patient [Risks] : risks [Consent Obtained] : written consent was obtained prior to the procedure and is detailed in the patient's record [Benefits] : benefits [Therapeutic] : therapeutic [#1 Site: ______] : #1 site identified in the [unfilled] [#2 Site: ___] : # 2 site identified in the [unfilled] [Ethyl Chloride] : ethyl chloride [Betadine] : with betadine solution [25 gauge 1.5 inch] : A 25 gauge 1.5 inch needle was used [___ml Orthovisc] : [unfilled] ml of orthovisc [Tolerated Well] : the patient tolerated the procedure well [No Complications] : there were no complications [Patient Instructed to Call] : patient was instructed to call if redness at site, a decrease in range of motion or an increase in pain is noted after procedure.

## 2019-07-09 NOTE — ASSESSMENT
[FreeTextEntry1] : Patient receives #3 of 3 OrthoVisc L knee; #2 of 3 R knee:\par Patient will benefit from continued physical therapy to help with joint mobility and muscle strengthening.  Encouraged to hydrate and minimal weight bearing for the next 24 hours along with ice application 20 mins on/ 20 mins off.  She is in agreement with the above plan and will return in one weeks' time.\par

## 2019-07-16 ENCOUNTER — APPOINTMENT (OUTPATIENT)
Dept: RHEUMATOLOGY | Facility: CLINIC | Age: 74
End: 2019-07-16
Payer: MEDICARE

## 2019-07-16 VITALS
TEMPERATURE: 97.2 F | OXYGEN SATURATION: 98 % | SYSTOLIC BLOOD PRESSURE: 150 MMHG | BODY MASS INDEX: 27 KG/M2 | HEIGHT: 61 IN | HEART RATE: 74 BPM | WEIGHT: 143 LBS | RESPIRATION RATE: 16 BRPM | DIASTOLIC BLOOD PRESSURE: 91 MMHG

## 2019-07-16 DIAGNOSIS — M17.0 BILATERAL PRIMARY OSTEOARTHRITIS OF KNEE: ICD-10-CM

## 2019-07-16 PROCEDURE — 20610 DRAIN/INJ JOINT/BURSA W/O US: CPT | Mod: RT

## 2019-07-16 RX ORDER — HYALURONATE SODIUM 30 MG/2 ML
30 SYRINGE (ML) INTRAARTICULAR
Qty: 0 | Refills: 0 | Status: COMPLETED | OUTPATIENT
Start: 2019-07-16

## 2019-07-16 RX ADMIN — Medication 0 MG/2ML: at 00:00

## 2019-07-16 NOTE — PROCEDURE
[Today's Date:] : Date: [unfilled] [Patient] : the patient [Risks] : risks [Benefits] : benefits [Consent Obtained] : written consent was obtained prior to the procedure and is detailed in the patient's record [Therapeutic] : therapeutic [#1 Site: ______] : #1 site identified in the [unfilled] [Ethyl Chloride] : ethyl chloride [Betadine] : betadine solution [25 gauge 1.5 inch] : A 25 gauge 1.5 inch needle was used [___ml Orthovisc] : [unfilled] ml of orthovisc [Tolerated Well] : the patient tolerated the procedure well [No Complications] : there were no complications

## 2019-07-16 NOTE — ASSESSMENT
[FreeTextEntry1] : Patient receives OrthoVisc  #3 of 3 R knee:\par Patient will benefit from continued physical therapy to help with joint mobility and muscle strengthening.  Encouraged to hydrate and minimal weight bearing for the next 24 hours along with ice application 20 mins on/ 20 mins off.  She is in agreement with the above plan and will return in three months' time.\par

## 2021-10-06 PROBLEM — I10 ESSENTIAL HYPERTENSION: Status: ACTIVE | Noted: 2019-05-15
